# Patient Record
Sex: MALE | URBAN - METROPOLITAN AREA
[De-identification: names, ages, dates, MRNs, and addresses within clinical notes are randomized per-mention and may not be internally consistent; named-entity substitution may affect disease eponyms.]

---

## 2024-03-25 ENCOUNTER — OFFICE VISIT (OUTPATIENT)
Age: 29
End: 2024-03-25
Payer: OTHER GOVERNMENT

## 2024-03-25 VITALS — BODY MASS INDEX: 21.42 KG/M2 | HEIGHT: 71 IN | WEIGHT: 153 LBS

## 2024-03-25 DIAGNOSIS — G56.03 BILATERAL CARPAL TUNNEL SYNDROME: Primary | ICD-10-CM

## 2024-03-25 DIAGNOSIS — M25.531 BILATERAL WRIST PAIN: ICD-10-CM

## 2024-03-25 DIAGNOSIS — M25.532 BILATERAL WRIST PAIN: ICD-10-CM

## 2024-03-25 PROCEDURE — 73110 X-RAY EXAM OF WRIST: CPT | Performed by: ORTHOPAEDIC SURGERY

## 2024-03-25 PROCEDURE — 99203 OFFICE O/P NEW LOW 30 MIN: CPT | Performed by: ORTHOPAEDIC SURGERY

## 2024-03-25 RX ORDER — LISDEXAMFETAMINE DIMESYLATE 30 MG/1
1 CAPSULE ORAL EVERY MORNING
COMMUNITY
Start: 2024-01-30 | End: 2024-03-31

## 2024-03-25 RX ORDER — GABAPENTIN 300 MG/1
CAPSULE ORAL
COMMUNITY
Start: 2023-07-15 | End: 2024-10-27

## 2024-03-25 RX ORDER — AMITRIPTYLINE HYDROCHLORIDE 50 MG/1
TABLET, FILM COATED ORAL
COMMUNITY
Start: 2023-07-15 | End: 2024-10-27

## 2024-03-25 NOTE — PROGRESS NOTES
Oleg Coy is a 29 y.o. male right handed .  Worker's Compensation and legal considerations: none    Chief Complaint   Patient presents with    Hand Pain     Bilateral hand pain     Pain Score:   8    HPI: Patient presents today with complaints of bilateral hand weakness right worse than left.  He also reports numbness into the right thumb.    Date of onset: Indeterminate  Injury: No  Prior Treatment:  No    ROS: Review of Systems - General ROS: negative except HPI    History reviewed. No pertinent past medical history.    History reviewed. No pertinent surgical history.     Current Outpatient Medications   Medication Sig Dispense Refill    gabapentin (NEURONTIN) 300 MG capsule TAKE ONE CAPSULE BY MOUTH THREE TIMES A DAY PAIN, ANXIETY      VYVANSE 30 MG capsule Take 1 capsule by mouth every morning.      amitriptyline (ELAVIL) 50 MG tablet TAKE ONE TABLET BY MOUTH AT BEDTIME AS NEEDED SLEEP, PAIN, ANXIETY, DEPRESSION NOTE NEW PILL SIZE, TAKE JUST ONE TABLET PER NIGHT       No current facility-administered medications for this visit.       No Known Allergies      Ht 1.803 m (5' 11\")   Wt 69.4 kg (153 lb)   BMI 21.34 kg/m²   Physical Exam  Constitutional:       General: He is not in acute distress.     Appearance: Normal appearance. He is not ill-appearing.   Cardiovascular:      Pulses: Normal pulses.   Pulmonary:      Effort: Pulmonary effort is normal. No respiratory distress.   Abdominal:      General: Abdomen is flat. There is no distension.   Musculoskeletal:         General: No swelling, tenderness, deformity or signs of injury. Normal range of motion.      Cervical back: Normal range of motion and neck supple.      Right lower leg: No edema.      Left lower leg: No edema.   Skin:     General: Skin is warm and dry.      Capillary Refill: Capillary refill takes less than 2 seconds.      Findings: No bruising or erythema.   Neurological:      General: No focal deficit present.      Mental

## 2024-06-04 ENCOUNTER — PROCEDURE VISIT (OUTPATIENT)
Age: 29
End: 2024-06-04
Payer: OTHER GOVERNMENT

## 2024-06-04 VITALS
HEART RATE: 78 BPM | SYSTOLIC BLOOD PRESSURE: 111 MMHG | DIASTOLIC BLOOD PRESSURE: 69 MMHG | RESPIRATION RATE: 14 BRPM | WEIGHT: 152.2 LBS | HEIGHT: 71 IN | TEMPERATURE: 97.1 F | BODY MASS INDEX: 21.31 KG/M2

## 2024-06-04 DIAGNOSIS — R20.0 NUMBNESS AND TINGLING IN BOTH HANDS: Primary | ICD-10-CM

## 2024-06-04 DIAGNOSIS — R20.2 NUMBNESS AND TINGLING IN BOTH HANDS: Primary | ICD-10-CM

## 2024-06-04 PROCEDURE — 95886 MUSC TEST DONE W/N TEST COMP: CPT | Performed by: PHYSICAL MEDICINE & REHABILITATION

## 2024-06-04 PROCEDURE — 95911 NRV CNDJ TEST 9-10 STUDIES: CPT | Performed by: PHYSICAL MEDICINE & REHABILITATION

## 2024-06-04 NOTE — PROGRESS NOTES
[facility]  [address] [place] [address], [address]  [place] [address]  Phone: [phone]  Fax: [phone]    Oleg Coy  : 1995  PCP: No primary care provider on file.  2024    ELECTROMYOGRAPHY AND NERVE CONDUCTION STUDIES    Oleg Coy was referred by Dr. Bolden for electrodiagnostic evaluation of numbness/tingling of both hands.    NCV & EMG Findings:  All remaining nerves (as indicated in the following tables) were within normal limits.    INTERPRETATION  This was a normal nerve conduction and EMG study showing there to be no signs of neuropathy, myopathy, or radiculopathy in the nerves and muscles tested.         CLINICAL INTERPRETATION  His electrodiagnostic findings do not appear to explain       HISTORY OF PRESENT ILLNESS  Oleg Coy is a 29 y.o. male.    Pt presents today with BUE EMG evaluation for numbness/tingling of both hands.    PAST MEDICAL HISTORY   History reviewed. No pertinent past medical history.    History reviewed. No pertinent surgical history..      MEDICATIONS    Current Outpatient Medications   Medication Sig Dispense Refill    gabapentin (NEURONTIN) 300 MG capsule TAKE ONE CAPSULE BY MOUTH THREE TIMES A DAY PAIN, ANXIETY      VYVANSE 30 MG capsule Take 1 capsule by mouth every morning.      amitriptyline (ELAVIL) 50 MG tablet TAKE ONE TABLET BY MOUTH AT BEDTIME AS NEEDED SLEEP, PAIN, ANXIETY, DEPRESSION NOTE NEW PILL SIZE, TAKE JUST ONE TABLET PER NIGHT       No current facility-administered medications for this visit.        ALLERGIES  No Known Allergies       SOCIAL HISTORY    Social History     Socioeconomic History    Marital status: Unknown     Spouse name: None    Number of children: None    Years of education: None    Highest education level: None   Tobacco Use    Smoking status: Former     Types: Cigarettes    Smokeless tobacco: Never   Vaping Use    Vaping Use: Some days    Substances: Nicotine       FAMILY

## 2024-06-06 ENCOUNTER — OFFICE VISIT (OUTPATIENT)
Age: 29
End: 2024-06-06

## 2024-06-06 VITALS — HEIGHT: 71 IN | BODY MASS INDEX: 21.23 KG/M2

## 2024-06-06 DIAGNOSIS — R20.0 NUMBNESS AND TINGLING IN BOTH HANDS: Primary | ICD-10-CM

## 2024-06-06 DIAGNOSIS — R20.2 NUMBNESS AND TINGLING IN BOTH HANDS: Primary | ICD-10-CM

## 2024-06-06 DIAGNOSIS — R25.1 TREMOR: ICD-10-CM

## 2024-06-06 NOTE — PROGRESS NOTES
To Neurology            Plan:     Referral to neurology for tremor as well as possible other explanation for numbness and tingling in both upper extremities  Patient has not had negative EMG and we will try to assess other possible causes.    Return if symptoms worsen or fail to improve.     Plan was reviewed with patient, who verbalized agreement and understanding of the plan    Note: This note was completed using voice recognition software.  Any typographical/name errors or mistakes are unintentional.

## 2025-06-02 NOTE — PROGRESS NOTES
[facility]  [address]  [place] [address]  Tel: [phone]  Fax: [phone]          INITIAL CONSULTATION      HISTORY OF PRESENT ILLNESS:  Oleg Coy is a 30 y.o. male who is referred from GIOVANY Amado secondary to cervical and thoracic spine pain. He rates his pain  5-9 /10. Patient comes into the office with c/o diffuse entire spine pain consisting of neck pain and stiffness with distal paresthesias localized to the hands bilaterally, thoracic spine pain located near the medial boarder of the right scapula, additionally reports lower back pain (thoracic>>cervical). His neck pain has been ongoing for 2-3 years with no specific injury. His thoracic spine pain has been ongoing ~10 years while completing a training exercise in the [place].       Patient says his pain is progressive in nature. He denies change in bowel or bladder habits. He denies loss of balance, falls, or impairments manual dexterity. His neck pain is exacerbated by looking up, his thoracic spine pain is exacerbated when being sedentary. He denies recent fevers, weight loss, rashes, or skin sores. He denies a hx of stomach ulcers or bleeding disorders. He denies a hx of history of spinal surgery or injections. Patient completed physical therapy x4 years ago, denies benefit for his pain; he is non compliant with his daily HEP. He denies recent chiropractic care. He denies a hx of DM. He reports that to his knowledge his kidneys function properly, GFR of 103 on 10/14/2022. He is currently on GABAPENTIN 300 mg TID; relief only provided for his lower back pain. States he was previously unable to tolerate a higher dose of GABAPENTIN; reasons unknown.       PmHx of former smoker.     Note from  dated 6/6/2025 indicating patient was seen for BUE EMG for numbness and tingling into BUE. He reports continues numbness and tingling into the fingers.    Note from GIOVANY Felix dated

## 2025-06-03 ENCOUNTER — TELEPHONE (OUTPATIENT)
Age: 30
End: 2025-06-03

## 2025-06-03 ENCOUNTER — OFFICE VISIT (OUTPATIENT)
Age: 30
End: 2025-06-03
Payer: OTHER GOVERNMENT

## 2025-06-03 VITALS — HEART RATE: 70 BPM | HEIGHT: 71 IN | BODY MASS INDEX: 21.23 KG/M2 | TEMPERATURE: 98 F | OXYGEN SATURATION: 100 %

## 2025-06-03 DIAGNOSIS — M54.6 THORACIC SPINE PAIN: Primary | ICD-10-CM

## 2025-06-03 DIAGNOSIS — M50.20 PROTRUSION OF CERVICAL INTERVERTEBRAL DISC: ICD-10-CM

## 2025-06-03 DIAGNOSIS — M41.9 SCOLIOSIS OF THORACOLUMBAR SPINE, UNSPECIFIED SCOLIOSIS TYPE: ICD-10-CM

## 2025-06-03 DIAGNOSIS — M54.2 CERVICAL PAIN: ICD-10-CM

## 2025-06-03 PROCEDURE — 72070 X-RAY EXAM THORAC SPINE 2VWS: CPT | Performed by: PHYSICAL MEDICINE & REHABILITATION

## 2025-06-03 PROCEDURE — 99204 OFFICE O/P NEW MOD 45 MIN: CPT | Performed by: PHYSICAL MEDICINE & REHABILITATION

## 2025-06-03 RX ORDER — METHYLPREDNISOLONE 4 MG/1
TABLET ORAL
Qty: 1 KIT | Refills: 0 | Status: SHIPPED | OUTPATIENT
Start: 2025-06-03

## 2025-06-03 NOTE — TELEPHONE ENCOUNTER
Faxed records to [facility] scheduling to schedule at [phone] requesting [facility] fields

## 2025-06-03 NOTE — TELEPHONE ENCOUNTER
Patient called. He is asking if his MRI order can be sent to [facility] for scheduling if possible.

## 2025-06-05 NOTE — TELEPHONE ENCOUNTER
Patient Spouse Marcos Coy called to provide us with the fax number for the patient MRI order to be sent to.        Fax  [phone]

## 2025-07-29 ENCOUNTER — OFFICE VISIT (OUTPATIENT)
Age: 30
End: 2025-07-29
Payer: OTHER GOVERNMENT

## 2025-07-29 VITALS
WEIGHT: 142 LBS | OXYGEN SATURATION: 98 % | TEMPERATURE: 98 F | HEART RATE: 68 BPM | HEIGHT: 71 IN | BODY MASS INDEX: 19.88 KG/M2

## 2025-07-29 DIAGNOSIS — M41.9 SCOLIOSIS OF THORACOLUMBAR SPINE, UNSPECIFIED SCOLIOSIS TYPE: ICD-10-CM

## 2025-07-29 DIAGNOSIS — M54.6 THORACIC SPINE PAIN: Primary | ICD-10-CM

## 2025-07-29 DIAGNOSIS — M54.2 CERVICAL PAIN: ICD-10-CM

## 2025-07-29 DIAGNOSIS — M50.20 PROTRUSION OF CERVICAL INTERVERTEBRAL DISC: ICD-10-CM

## 2025-07-29 DIAGNOSIS — M79.7 FIBROMYALGIA: ICD-10-CM

## 2025-07-29 PROCEDURE — 99214 OFFICE O/P EST MOD 30 MIN: CPT | Performed by: PHYSICAL MEDICINE & REHABILITATION

## 2025-07-29 NOTE — PROGRESS NOTES
[facility]  [address]  [place] [address]  Tel: [phone]  Fax: [phone]          PROGRESS NOTE      HISTORY OF PRESENT ILLNESS:  The patient is a 30 y.o. male and was seen today for follow up of diffuse entire spine pain consisting of neck pain and stiffness with distal paresthesias localized to the hands bilaterally, thoracic spine pain located near the medial boarder of the right scapula, additionally reports lower back pain (thoracic>>cervical). His neck pain has been ongoing for 2-3 years with no specific injury. His thoracic spine pain has been ongoing ~10 years while completing a training exercise in the [place]. Patient says his pain is progressive in nature. He denies change in bowel or bladder habits. He denies loss of balance, falls, or impairments manual dexterity. His neck pain is exacerbated by looking up, his thoracic spine pain is exacerbated when being sedentary. He denies recent fevers, weight loss, rashes, or skin sores. He denies a hx of stomach ulcers or bleeding disorders. He denies a hx of history of spinal surgery or injections. Patient completed physical therapy x4 years ago, denies benefit for his pain; he is non compliant with his daily HEP. He denies recent chiropractic care. He denies a hx of DM. He reports that to his knowledge his kidneys function properly, GFR of 103 on 10/14/2022. He is currently on GABAPENTIN 300 mg TID; relief only provided for his lower back pain. States he was previously unable to tolerate a higher dose of GABAPENTIN; reasons unknown. PmHx of former smoker. Note from  dated 6/6/2025 indicating patient was seen for BUE EMG for numbness and tingling into BUE. He reports continues numbness and tingling into the fingers. Note from GIOVANY Felix dated 4/1/2025 indicating patient was seen for cervical radiculopathy. Neck pain and stiffness ongoing numbness tingling, and weakness ongoing x5 years.